# Patient Record
Sex: FEMALE | Race: WHITE | NOT HISPANIC OR LATINO | Employment: FULL TIME | URBAN - METROPOLITAN AREA
[De-identification: names, ages, dates, MRNs, and addresses within clinical notes are randomized per-mention and may not be internally consistent; named-entity substitution may affect disease eponyms.]

---

## 2020-06-29 ENCOUNTER — APPOINTMENT (OUTPATIENT)
Dept: RADIOLOGY | Facility: CLINIC | Age: 50
End: 2020-06-29
Payer: COMMERCIAL

## 2020-06-29 ENCOUNTER — OFFICE VISIT (OUTPATIENT)
Dept: URGENT CARE | Facility: CLINIC | Age: 50
End: 2020-06-29
Payer: COMMERCIAL

## 2020-06-29 VITALS
DIASTOLIC BLOOD PRESSURE: 85 MMHG | RESPIRATION RATE: 18 BRPM | SYSTOLIC BLOOD PRESSURE: 128 MMHG | HEIGHT: 63 IN | TEMPERATURE: 97.2 F | BODY MASS INDEX: 30.12 KG/M2 | OXYGEN SATURATION: 97 % | HEART RATE: 82 BPM | WEIGHT: 170 LBS
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
## 2020-06-29 DIAGNOSIS — S53.31XA GAMEKEEPER'S THUMB OF RIGHT HAND, INITIAL ENCOUNTER: ICD-10-CM
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
## 2020-06-29 DIAGNOSIS — S43.102A AC SEPARATION, LEFT, INITIAL ENCOUNTER: ICD-10-CM

## 2020-06-29 DIAGNOSIS — S22.32XA CLOSED FRACTURE OF ONE RIB OF LEFT SIDE, INITIAL ENCOUNTER: ICD-10-CM

## 2020-06-29 PROCEDURE — 71101 X-RAY EXAM UNILAT RIBS/CHEST: CPT

## 2020-06-29 PROCEDURE — 73140 X-RAY EXAM OF FINGER(S): CPT

## 2020-06-29 PROCEDURE — 73000 X-RAY EXAM OF COLLAR BONE: CPT

## 2020-06-29 PROCEDURE — 73030 X-RAY EXAM OF SHOULDER: CPT

## 2020-06-29 PROCEDURE — 99203 OFFICE O/P NEW LOW 30 MIN: CPT | Performed by: EMERGENCY MEDICINE

## 2020-07-01 ENCOUNTER — EVALUATION (OUTPATIENT)
Dept: PHYSICAL THERAPY | Facility: CLINIC | Age: 50
End: 2020-07-01
Payer: COMMERCIAL

## 2020-07-01 DIAGNOSIS — S43.102A AC SEPARATION, LEFT, INITIAL ENCOUNTER: Primary | ICD-10-CM

## 2020-07-01 DIAGNOSIS — M25.512 ACUTE PAIN OF LEFT SHOULDER: ICD-10-CM

## 2020-07-01 PROCEDURE — 97161 PT EVAL LOW COMPLEX 20 MIN: CPT | Performed by: PHYSICAL THERAPIST
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
## 2020-07-08 ENCOUNTER — OFFICE VISIT (OUTPATIENT)
Dept: PHYSICAL THERAPY | Facility: CLINIC | Age: 50
End: 2020-07-08
Payer: COMMERCIAL

## 2020-07-08 DIAGNOSIS — M25.512 ACUTE PAIN OF LEFT SHOULDER: ICD-10-CM

## 2020-07-08 DIAGNOSIS — S43.102A AC SEPARATION, LEFT, INITIAL ENCOUNTER: Primary | ICD-10-CM

## 2020-07-08 PROCEDURE — 97110 THERAPEUTIC EXERCISES: CPT

## 2020-07-08 NOTE — PROGRESS NOTES
Daily Note     Today's date: 2020  Patient name: Christin Araujo  : 1970  MRN: [id number]  Referring provider: Trista Mann MD  Dx:   Encounter Diagnosis     ICD-10-CM    1  AC separation, left, initial encounter S43 102A    2  Acute pain of left shoulder M25 512                   Subjective: Patient reports soreness and heaviness continues  Patient reports upper shoulder is really sore  Objective: See treatment diary below      Assessment: Christin Araujo continues with pain in shoulder and ribs  Pain in ribs appears to most limiting factor  Christin Araujo was able to perform exercises with proper technique and intensity  Lawerance Deny has increased in ability to actively move shoulder  she would benefit from continued PT to decrease upper trap hypertrophy  Plan: Continue per plan of care  Precautions: injury:  L rib fracture- no aggressive stretching or lifting      Daily Treatment Diary:      Initial Evaluation Date: 20  Compliance                    Visit Number 1 2                   Re-Eval  IE                 [address]   Foto Captured Y                                              Manual                      L shoulder ROM   SJ                   UT/LS STM on L  SJ                                         Ther-Ex                      pulleys   3'                   pendulums HEP                     Finger ladder    with scapular depression x10                   scap  restractions HEP                     Shoulder rolls-fwd/bwk HEP                     UT stretch  HEP                     Shoulder cane flex     x15                   C/S ROM HEP                     Retraction depressions   x15                                         Neuro Re-Ed                                                                                                Ther-Act                                                               Modalities                      h-wave/ CP prn 10' def

## 2020-07-13 ENCOUNTER — OFFICE VISIT (OUTPATIENT)
Dept: PHYSICAL THERAPY | Facility: CLINIC | Age: 50
End: 2020-07-13
Payer: COMMERCIAL

## 2020-07-13 DIAGNOSIS — M25.512 ACUTE PAIN OF LEFT SHOULDER: ICD-10-CM

## 2020-07-13 DIAGNOSIS — S43.102A AC SEPARATION, LEFT, INITIAL ENCOUNTER: Primary | ICD-10-CM

## 2020-07-13 PROCEDURE — 97110 THERAPEUTIC EXERCISES: CPT | Performed by: PHYSICAL THERAPIST

## 2020-07-13 NOTE — PROGRESS NOTES
Daily Note     Today's date: 2020  Patient name: Bennie Simental  : 1970  MRN: [id number]  Referring provider: Luz Maria Silva MD  Dx:   Encounter Diagnosis     ICD-10-CM    1  AC separation, left, initial encounter S43 102A    2  Acute pain of left shoulder M25 512                   Subjective: Pt notes that she has been feeling better and has been able to perform more ADL's at home  She states that the ribs are  and limit her ROM in her shoulder at times  Objective: See treatment diary below      Assessment: Tolerated treatment well with minimal pain noted in shoulder and ribs  Pt's rib pain continues to be a limiting factor, but is showing improvement since last visit  PT educated pt on proper posture and possible compensation patterns which could be contributing to the UT/LS tightness she is feeling  Pt had relief with manual TpR to area  Continue to incorporate shoulder strengthening exercises as tolerated  Introduced wall slides to perform at home for shoulder ROM, pt verbalized understanding  Patient demonstrated fatigue post treatment and would benefit from continued PT to address current limitations  Plan: Continue per plan of care  Precautions: injury:  L rib fracture- no aggressive stretching or lifting      Daily Treatment Diary:      Initial Evaluation Date: 20  Compliance                  Visit Number 1 2  3                 Re-Eval  IE   -              MC   Foto Captured Y   -                                         Manual                      L shoulder ROM    SJ -                 UT/LS STM on L  SJ arb                                       Ther-Ex                      pulleys    3'  4'                 pendulums HEP    2x10 circles, A/P                 Finger ladder     with scapular depression x10  with scapular depression 10x                  scap   restractions HEP                     Shoulder rolls-fwd/bwk HEP UT stretch  HEP    3x30"                 Shoulder cane flex      x15 2x10                 C/S ROM HEP    10x ea                 Retraction depressions   x15  2x10 5"                                       Neuro Re-Ed                                                                                                Ther-Act                                                               Modalities                      h-wave/ CP prn 10' def 10'

## 2020-07-20 ENCOUNTER — OFFICE VISIT (OUTPATIENT)
Dept: PHYSICAL THERAPY | Facility: CLINIC | Age: 50
End: 2020-07-20
Payer: COMMERCIAL

## 2020-07-20 DIAGNOSIS — M25.512 ACUTE PAIN OF LEFT SHOULDER: ICD-10-CM

## 2020-07-20 DIAGNOSIS — S43.102A AC SEPARATION, LEFT, INITIAL ENCOUNTER: Primary | ICD-10-CM

## 2020-07-20 PROCEDURE — 97110 THERAPEUTIC EXERCISES: CPT | Performed by: PHYSICAL THERAPIST

## 2020-07-20 NOTE — PROGRESS NOTES
Daily Note     Today's date: 2020  Patient name: Kevin Pedroza  : 1970  MRN: [id number]  Referring provider: Shawanda Gonzalez MD  Dx:   Encounter Diagnosis     ICD-10-CM    1  AC separation, left, initial encounter S43 102A    2  Acute pain of left shoulder M25 512                   Subjective: Patient feels good today, was able to sleep in bed last night and is tolerating supine positioning better  Reports some tightness after a lot of activity yesterday  Objective: See treatment diary below    Patient was treated by SUGEY Butterfield under the direct supervision of Juliana Donohue, PT, DPT for 30 mins of Therex and 10 mins of manual     Assessment: Tolerated treatment well  Patient reports that her rib pain is decreasing which allows better ROM with shoulder elevation  Serratus punches and D2 PNF were added to strengthen scapular stabilizers, patient is tolerating overhead exercises better than last session  She reports completing HEP 4x per week and increasing activity with both arms, including vacuuming, gardening and driving, indicating improved tolerance to activity and proper healing  Ribs are  to palpation and elevation with internal rotation causes discomfort in [facility] joint with resistance  Patient exhibited good technique with therapeutic exercises and would benefit from continued PT to address limitations  Plan: Continue per plan of care  Progress treatment as tolerated         Precautions: injury:  L rib fracture- no aggressive stretching or lifting      Daily Treatment Diary:      Initial Evaluation Date: 20  Compliance                Visit Number 1 2   3  4               Re-Eval  IE    -  -            MC   Foto Captured Y    -  -                                     Manual                      L shoulder ROM    SJ -  dvl               UT/LS STM on L  SJ arb  dvl               UT/LS TPR       dvl               Ther-Ex pulleys    3'   4'  5'               pendulums HEP     2x10 circles, A/P  30x circles ea               Finger ladder     with scapular depression x10   with scapular depression 10x   15x               scap  restractions HEP      -               Shoulder rolls-fwd/bwk HEP      -               UT stretch  HEP     3x30"  3x30"               Shoulder cane flex      x15 2x10  sitting 2x10               C/S ROM HEP     10x ea  15x ea               Retraction depressions   x15   2x10 5"  2x15 5" into plinth               Serratus punches    2x10          PNF D2      AAROM + manual resist               Neuro Re-Ed                                                                                                Ther-Act                                                               Modalities                      h-wave/ CP prn 10' def 10'  10'

## 2020-07-27 ENCOUNTER — OFFICE VISIT (OUTPATIENT)
Dept: PHYSICAL THERAPY | Facility: CLINIC | Age: 50
End: 2020-07-27
Payer: COMMERCIAL

## 2020-07-27 DIAGNOSIS — M25.512 ACUTE PAIN OF LEFT SHOULDER: ICD-10-CM

## 2020-07-27 DIAGNOSIS — S43.102A AC SEPARATION, LEFT, INITIAL ENCOUNTER: Primary | ICD-10-CM

## 2020-07-27 PROCEDURE — 97110 THERAPEUTIC EXERCISES: CPT | Performed by: PHYSICAL THERAPIST

## 2020-07-27 NOTE — PROGRESS NOTES
Daily Note     Today's date: 2020  Patient name: Sandra Peña  : 1970  MRN: [id number]  Referring provider: Rainer Edouard MD  Dx:   Encounter Diagnosis     ICD-10-CM    1  AC separation, left, initial encounter S43 102A    2  Acute pain of left shoulder M25 512        Start Time: 0830          Subjective: Patient reports that her pain has been decreasing steadily over the past week  She continues to feel tightness in the UT, notes that stretching helps  She reports that she felt discomfort in her shoulder when pushing one of her horses  Objective: See treatment diary below    Patient was treated by SUGEY Vargas under the direct supervision of Kirstin Enciso, PT        Assessment: Tolerated treatment progressions well, patient was able to complete exercises without an increase in pain  Patient reports that her L shoulder feels weak with protraction and pressing, future sessions will continue to progress these movements  Apparent ROM continues to increase with some guarding noted in end range elevation  Patient exhibited good technique with therapeutic exercises and would benefit from continued PT to address limitations  Plan: Continue per plan of care  Progress treatment as tolerated         Precautions: injury:  L rib fracture- no aggressive stretching or lifting      Daily Treatment Diary:      Initial Evaluation Date: 20  Compliance              Visit Number 1 2   3   4  5             Re-Eval  IE    -   -  -          MC   Foto Captured Y    -   -  Y                                 Manual                      L shoulder ROM    SJ -   dvl  dvl             UT/LS STM on L  SJ arb   dvl  dvl             UT/LS TPR       dvl  dvl             Ther-Ex                      pulleys    3'   4'   5'  -             Arm bike  - - - 5'        pendulums HEP     2x10 circles, A/P   30x circles ea  30x             Finger ladder     with scapular depression x10   with scapular depression 10x    15x  10x 10" at top             scap  retractions HEP       -  -             Shoulder rolls-fwd/bwk HEP       -  -             UT stretch  HEP     3x30"   3x30"  4x30"             Wall pushups     2x10        Shoulder cane flex      x15 2x10   sitting 2x10  sitting 3x10             C/S ROM HEP     10x ea   15x ea  20x ea             Retraction depressions   x15   2x10 5"   2x15 5" into plinth  30x 5" into plinth             Serratus punches    2x10  #3 2x10        PNF D2      AAROM + manual resist 5'  -             Neuro Re-Ed                                                                                                Ther-Act                                                               Modalities                      h-wave/ CP prn 10' def 10'  10' 10'

## 2020-08-03 ENCOUNTER — APPOINTMENT (OUTPATIENT)
Dept: PHYSICAL THERAPY | Facility: CLINIC | Age: 50
End: 2020-08-03
Payer: COMMERCIAL

## 2020-08-10 ENCOUNTER — APPOINTMENT (OUTPATIENT)
Dept: PHYSICAL THERAPY | Facility: CLINIC | Age: 50
End: 2020-08-10
Payer: COMMERCIAL

## 2020-08-12 ENCOUNTER — OFFICE VISIT (OUTPATIENT)
Dept: PHYSICAL THERAPY | Facility: CLINIC | Age: 50
End: 2020-08-12
Payer: COMMERCIAL

## 2020-08-12 DIAGNOSIS — M25.512 ACUTE PAIN OF LEFT SHOULDER: ICD-10-CM

## 2020-08-12 DIAGNOSIS — S43.102A AC SEPARATION, LEFT, INITIAL ENCOUNTER: Primary | ICD-10-CM

## 2020-08-12 PROCEDURE — 97140 MANUAL THERAPY 1/> REGIONS: CPT

## 2020-08-12 NOTE — PROGRESS NOTES
Daily Note     Today's date: 2020  Patient name: Jayla Holm  : 1970  MRN: [id number]  Referring provider: Zack Anton MD  Dx:   Encounter Diagnosis     ICD-10-CM    1  AC separation, left, initial encounter  S43 102A    2  Acute pain of left shoulder  M25 512                   Subjective: Pt mentions that she is seeing improvement throughout her therapy  She states that she has been able to start doing her housework without pain but remains cautious of working with her horses and has not yet started riding  She states that her pain has decreased significantly but still feels some discomfort in her ribs  She also mentions that she feels slightly weak in her L shoulder and would like to work on strengthening that area  Objective: See treatment diary below      Assessment: Continued shoulder depressions and retractions to assist with increasing strength to B shoulders  Pt was able to complete serratus punches with #3 free weight with minimal discomfort to affected area, mainly felt muscle fatigue post exercise  Continued stretching to UT, pt responded well and displayed increased ROM post exercise  Continued STM and TrP release to UT, pt felt less tense post intervention and displayed increased ROM at end of Rx  Plan: Continue per plan of care  Continue to progress as necessary  Patient was treated by Lew Aschoff, SPTA under supervision of Iesha Orlando PTA-IS       Precautions: injury:  L rib fracture- no aggressive stretching or lifting      Daily Treatment Diary:      Initial Evaluation Date: 20  Compliance            Visit Number 1 2   3   4  5  6           Re-Eval  IE    -   -  -          MC   Foto Captured Y    -   -  Y                                Manual                      L shoulder ROM    SJ -   dvl  dvl  SES           UT/LS STM on L  SJ arb   dvl  dvl  SES           UT/LS TPR       dvl  dvl SES           Ther-Ex                      pulleys    3'   4'   5'  -             Arm bike  - - - 5' 5'       pendulums HEP     2x10 circles, A/P   30x circles ea  30x  30x           Finger ladder     with scapular depression x10   with scapular depression 10x    15x  10x 10" at top  10x10'' at top           scap  retractions HEP       -  -             Shoulder rolls-fwd/bwk HEP       -  -             UT stretch  HEP     3x30"   3x30"  4x30"  4x30''           Wall pushups     2x10 2x10       Shoulder cane flex      x15 2x10   sitting 2x10  sitting 3x10  sitting 3x10           C/S ROM HEP     10x ea   15x ea  20x ea  20x ea           Retraction depressions   x15   2x10 5"   2x15 5" into plinth  30x 5" into plinth  30x 5'' into plinth           Serratus punches    2x10  #3 2x10 #3 2x10       PNF D2      AAROM + manual resist 5'  -             Neuro Re-Ed                                                                                                Ther-Act                                                               Modalities                      h-wave/ CP prn 10' def 10'  10' 10' NP

## 2020-08-17 ENCOUNTER — OFFICE VISIT (OUTPATIENT)
Dept: PHYSICAL THERAPY | Facility: CLINIC | Age: 50
End: 2020-08-17
Payer: COMMERCIAL

## 2020-08-17 DIAGNOSIS — M25.512 ACUTE PAIN OF LEFT SHOULDER: ICD-10-CM

## 2020-08-17 DIAGNOSIS — S43.102A AC SEPARATION, LEFT, INITIAL ENCOUNTER: Primary | ICD-10-CM

## 2020-08-17 PROCEDURE — 97140 MANUAL THERAPY 1/> REGIONS: CPT

## 2020-08-17 NOTE — PROGRESS NOTES
Daily Note     Today's date: 2020  Patient name: Bard Banks  : 1970  MRN: [id number]  Referring provider: Santa Atr MD  Dx:   Encounter Diagnosis     ICD-10-CM    1  AC separation, left, initial encounter  S43 102A    2  Acute pain of left shoulder  M25 512                   Subjective: Patient reports has some rib discomfort from mowing with the tractor but her shoulder feels pretty good  Objective: See treatment diary below      Assessment: Bard Banks continues with good  progress towards goals  Patient pain appears to be in ribs should decrease with limiting movements that increase pain  Jesse's shoulder appears to be increasing ROM and improving in regards to strengthening  she required moderate verbal cueing to complete exercises appropriate form and intensity  Patient shoulder function should improve with continued treatments  Plan: Continue per plan of care  Precautions: injury:  L rib fracture- no aggressive stretching or lifting      Daily Treatment Diary:      Initial Evaluation Date: 20  Compliance          Visit Number 1 2   3   4  5  6  7         Re-Eval  IE    -   -  -          MC   Foto Captured Y    -   -  Y                             Manual                      L shoulder ROM    SJ -   dvl  dvl  SES  SJ         UT/LS STM on L  SJ arb   dvl  dvl  SES  SJ         UT/LS TPR       dvl  dvl  SES  SJ         Ther-Ex                      pulleys    3'   4'   5'  -             Arm bike  - - - 5' 5' 5'/5'      pendulums HEP     2x10 circles, A/P   30x circles ea  30x  30x  1#  x30         Finger ladder     with scapular depression x10   with scapular depression 10x    15x  10x 10" at top  10x10'' at top  10x 10" at top         scap   retractions HEP       -  -             Shoulder rolls-fwd/bwk HEP       -  -             UT stretch  HEP     3x30"   3x30"  4x30"  4x30'' 4x30"         Wall pushups     2x10 2x10 2x10      Shoulder cane flex      x15 2x10   sitting 2x10  sitting 3x10  sitting 3x10  sitting 3x10         C/S ROM HEP     10x ea   15x ea  20x ea  20x ea  20x ea         Retraction depressions   x15   2x10 5"   2x15 5" into plinth  30x 5" into plinth  30x 5'' into plinth  30x 5'' into plinth         Serratus punches    2x10  #3 2x10 #3 2x10 #3  3x10      PNF D2      AAROM + manual resist 5'  -             Neuro Re-Ed                                                                                                Ther-Act                                                               Modalities                      h-wave/ CP prn 10' def 10'  10' 10' NP def

## 2020-08-24 ENCOUNTER — OFFICE VISIT (OUTPATIENT)
Dept: PHYSICAL THERAPY | Facility: CLINIC | Age: 50
End: 2020-08-24
Payer: COMMERCIAL

## 2020-08-24 DIAGNOSIS — M25.512 ACUTE PAIN OF LEFT SHOULDER: ICD-10-CM

## 2020-08-24 DIAGNOSIS — S43.102A AC SEPARATION, LEFT, INITIAL ENCOUNTER: Primary | ICD-10-CM

## 2020-08-24 PROCEDURE — 97110 THERAPEUTIC EXERCISES: CPT | Performed by: PHYSICAL THERAPIST

## 2020-08-24 NOTE — PROGRESS NOTES
Daily Note     Today's date: 2020  Patient name: Angel Christianson  : 1970  MRN: [id number]  Referring provider: Pam Sarmiento MD  Dx:   Encounter Diagnosis     ICD-10-CM    1  AC separation, left, initial encounter  S43 102A    2  Acute pain of left shoulder  M25 512                   Subjective: Pt states that she continues to have some pain in ribs, but her shoulder is moving better  She has been compliant with HEP  Objective: See treatment diary below      Assessment: Tolerated treatment well  Shoulder ROM and strength continues to improve  Introduced shoulder stabilization exercises today, she noted fatigue, but no increased pain  Pt continues to complain of minimal rib discomfort with specific movements, but is improving  UT/ LS continues to be tight L>R, she notes that the stretches at home help with her tightness  Patient demonstrated fatigue post treatment and would benefit from continued PT to address current limitations  Pt was seen by SUGEY Jolley under direct supervision of Rad Carrera, PT, DPT, CLT for 10' of manual      Plan: Continue per plan of care        Precautions: injury:  L rib fracture- no aggressive stretching or lifting      Daily Treatment Diary:      Initial Evaluation Date: 20  Compliance        Visit Number 1 2   3   4  5  6  7  8       Re-Eval  IE    -   -  -      -       Foto Captured Y    -   -  Y      -                     Manual                      L shoulder ROM    SJ -   dvl  dvl  SES  SJ  -       UT/LS STM on L  SJ arb   dvl  dvl  SES  SJ  AK       UT/LS TPR       dvl  dvl  SES  SJ  AK       Ther-Ex                      pulleys    3'   4'   5'  -             Arm bike  - - - 5' 5' 5'/5' 5'/5'     pendulums HEP     2x10 circles, A/P   30x circles ea  30x  30x  1#  x30 #1 30x        Finger ladder     with scapular depression x10   with scapular depression 10x    15x  10x 10" at top  10x10'' at top  10x 10" at top 10x10   at top        scap  retractions HEP       -  -             Shoulder rolls-fwd/bwk HEP       -  -             UT stretch  HEP     3x30"   3x30"  4x30"  4x30''  4x30"  4x30"       Wall pushups     2x10 2x10 2x10 2x10      Shoulder cane flex      x15 2x10   sitting 2x10  sitting 3x10  sitting 3x10  sitting 3x10  scaption- no cane 2x10 #1, 10x #2       C/S ROM HEP     10x ea   15x ea  20x ea  20x ea  20x ea  20x ea       Retraction depressions   x15   2x10 5"   2x15 5" into plinth  30x 5" into plinth  30x 5'' into plinth  30x 5'' into plinth  TB rows red 2x10        Serratus punches    2x10  #3 2x10 #3 2x10 #3  3x10 #4 3x10      PNF D2      AAROM + manual resist 5'  -      -       Neuro Re-Ed                                                                                                Ther-Act                                                               Modalities                      h-wave/ CP prn 10' def 10'  10' 10' NP def

## 2020-08-31 ENCOUNTER — EVALUATION (OUTPATIENT)
Dept: PHYSICAL THERAPY | Facility: CLINIC | Age: 50
End: 2020-08-31
Payer: COMMERCIAL

## 2020-08-31 DIAGNOSIS — M25.512 ACUTE PAIN OF LEFT SHOULDER: ICD-10-CM

## 2020-08-31 DIAGNOSIS — S43.102A AC SEPARATION, LEFT, INITIAL ENCOUNTER: Primary | ICD-10-CM

## 2020-08-31 PROCEDURE — 97110 THERAPEUTIC EXERCISES: CPT | Performed by: PHYSICAL THERAPIST

## 2020-08-31 NOTE — PROGRESS NOTES
Re-Evalaution    Today's date: 2020  Patient name: Reymundo Jennings  : 1970  MRN: [id number]  Referring provider: Yue Jarrett MD  Dx:   Encounter Diagnosis     ICD-10-CM    1  AC separation, left, initial encounter  S43 102A    2  Acute pain of left shoulder  M25 512                      Assessment  Reymundo Jennings has been compliant with attending PT and HEP since initial eval  Her pain levels, ROM, and strength in L shoulder have improved since IE  Reymundo Jennings has made improvements in objective data since IE but is still limited compared to normal  She continues to have weakness in L shoulder compared to contralateral side and some tightness at end range of L shoulder motion  Reymundo Jennings continues with above listed impairments and would benefit from additional skilled PT to address these deficits to return to PLOF  Impairments: abnormal muscle firing, abnormal or restricted ROM, activity intolerance, impaired physical strength, lacks appropriate home exercise program, pain with function and poor posture     Symptom irritability: highUnderstanding of Dx/Px/POC: excellent    Goals  STG  1) L shoulder ROM will improve 5-10 degrees in 3 weeks  MET  2) L shoulder strength will improve 1/2 grade in 3 weeks  MET  3) Pt will decrease pain levels 2-3 at its worst in 3 weeks  MET    LTG  1) Patient is independent in HEP within 6 weeks  MET  2) Patient completes overhead activity with 0-1/10 pain within 6 weeks  PARTIALLY MET (some discomfort with repetitive activity OH)  3) L shoulder ROM will be WNL by D/C  PARTIALLY MET (some tightness in L shoulder abduction)  4) L shoulder strength will be equal to contralateral side by D/C   NOT MET    Plan  Patient would benefit from: skilled physical therapy  Planned modality interventions: hydrotherapy, H-Wave, high voltage pulsed current: pain management, cryotherapy and TENS  Planned therapy interventions: neuromuscular re-education, massage, manual therapy, joint mobilization, abdominal trunk stabilization, body mechanics training, breathing training, patient education, strengthening, stretching, therapeutic activities, flexibility, functional ROM exercises, therapeutic exercise and home exercise program  Frequency: 1x week (Pt has a high deductible- is going to be self-pay for right now)  Duration in weeks: 3  Treatment plan discussed with: patient        Subjective Evaluation    History of Present Illness  Pt notes that her ROM and pain levels have improved immensely since she has been coming to PT  She notes that holding weight in her L arm is the what causes her pain currently  She notes that she is able to push and pull without a problem now  She notes that the rib pain has been much better, but lifting from the floor is bothersome at times  She states that repatative motion OH will cause some discomfort in anterior shoulder, but is improving  Quality of life: excellent    Pain  Current pain ratin  At best pain ratin  At worst pain ratin  Location: UT, AC joint on L  Quality: dull ache and sharp  Relieving factors: heat and ice  Aggravating factors: lifting and overhead activity    Hand dominance: right      Diagnostic Tests  X-ray: abnormal (rib fracture on L)  Treatments  No previous or current treatments  Patient Goals  Patient goals for therapy: decreased pain, increased motion, increased strength, independence with ADLs/IADLs and return to work  Patient goal: "I want to get back to mountain biking "        Objective     Postural Observations  Seated posture: fair  Standing posture: fair    Additional Postural Observation Details  Rounded shoulder posture     Palpation   Left   Hypertonic in the upper trapezius  Minimal tenderness of the levator scapulae, serratus anterior, supraspinatus and upper trapezius  Trigger point to upper trapezius       Tenderness     Left Shoulder   Minimal tenderness in the [facility] joint, acromion, clavicle and medial scapula  Cervical/Thoracic Screen   Cervical range of motion within normal limits  Cervical range of motion within normal limits with the following exceptions: Some soreness felt during flex/ext  On R    Neurological Testing     Sensation     Shoulder   Left Shoulder   Intact: light touch    Right Shoulder   Intact: light touch    Active Range of Motion   Left Shoulder   Flexion: WNL  Abduction: WNL- tightness felt at end range  External rotation 45°: WNL  Internal rotation 45°: WNL    Right Shoulder   Normal active range of motion          Passive Range of Motion   Left Shoulder   Flexion: WNL  Abduction: WNL  External rotation 45°: WNL  Internal rotation 45°: WNL    Strength/Myotome Testing     Right Shoulder   Normal muscle strength    Additional Strength Details  Flexion: 4/5  Abduction:4/5  Extension: 4+/5  ER:4+/5  IR:4+/5    Tests     Left Shoulder   Negative AC shear, Hawkin's, passive horizontal adduction and sulcus sign  Additional Tests Details  No step down present at [facility] joint on L       Pt was seen by Phuong Lopez PTA for manual work today            Precautions: injury: 6/26 L rib fracture- no aggressive stretching or lifting      Daily Treatment Diary:      Initial Evaluation Date: 07/01/20  Compliance 7/1 7/8 7/13 7/20 7/27 8/12 8/17 8/24 8/31     Visit Number 1 2   3   4  5  6  7  8  9     Re-Eval  IE    -   -  -      -  -     Foto Captured Y    -   -  Y      -  -            7/1 7/8 7/13 7/20 7/27 8/12 8/17 8/24 8/31     Manual                      L shoulder ROM    SJ -   dvl  dvl  SES  SJ  -       UT/LS STM on L  SJ arb   dvl  dvl  SES  SJ  AK  SJ     UT/LS TPR       dvl  dvl  SES  SJ  AK  SJ     Ther-Ex                      pulleys    3'   4'   5'  -             Arm bike  - - - 5' 5' 5'/5' 5'/5' 3/3' L2    pendulums HEP     2x10 circles, A/P   30x circles ea  30x  30x  1#  x30 #1 30x   D/C     Finger ladder     with scapular depression x10 with scapular depression 10x    15x  10x 10" at top  10x10'' at top  10x 10" at top 10x10   at top   D/C     UT stretch  HEP     3x30"   3x30"  4x30"  4x30''  4x30"  4x30"       Wall pushups     2x10 2x10 2x10 2x10  Plinth 2x10     Shoulder cane flex  x15 2x10   sitting 2x10  sitting 3x10  sitting 3x10  sitting 3x10  scaption- no cane 2x10 #1, 10x #2  D/C     C/S ROM HEP     10x ea   15x ea  20x ea  20x ea  20x ea  20x ea       Retraction depressions   x15   2x10 5"   2x15 5" into plinth  30x 5" into plinth  30x 5'' into plinth  30x 5'' into plinth  TB rows red 2x10        Serratus punches    2x10  #3 2x10 #3 2x10 #3  3x10 #4 3x10      Serratus wall climbs         Green 2x10 5"    Ball slaps         To fatigue- yellow     Y's /T's         2x10 ea     Body blade         To fatigue 90 deg  flex And 90 abd   And ER 3x30"                 Neuro Re-Ed                                                                                                Ther-Act                                                               Modalities                      h-wave/ CP prn 10' def 10'  10' 10' NP def

## 2020-09-14 ENCOUNTER — OFFICE VISIT (OUTPATIENT)
Dept: PHYSICAL THERAPY | Facility: CLINIC | Age: 50
End: 2020-09-14
Payer: COMMERCIAL

## 2020-09-14 DIAGNOSIS — S43.102A AC SEPARATION, LEFT, INITIAL ENCOUNTER: Primary | ICD-10-CM

## 2020-09-14 DIAGNOSIS — M25.512 ACUTE PAIN OF LEFT SHOULDER: ICD-10-CM

## 2020-09-14 PROCEDURE — 97140 MANUAL THERAPY 1/> REGIONS: CPT

## 2020-09-14 NOTE — PROGRESS NOTES
Daily Note     Today's date: 2020  Patient name: Brigid Finney  : 1970  MRN: [id number]  Referring provider: Vanessa Marquez MD  Dx:   Encounter Diagnosis     ICD-10-CM    1  AC separation, left, initial encounter  S43 102A    2  Acute pain of left shoulder  M25 512                   Subjective: Patient reports some tension in between her shoulder blades but anterior shoulder pain is gone  Reports able to use shoulder a lot more for things like mowing and horseback riding  Objective: See treatment diary below      Assessment: Brigid sandyues with good  progress towards functional use goals  Patient strength appears to be improving but requires focus of treatement to improve scapular stability which appears to to be cause of medial scapular "tension"  she required moderate verbal cueing to complete exercises appropriate form and intensity with minimal tactile cues  Patient scapular stability should improve with continued treatments  Plan: Continue per plan of care        Precautions: injury:  L rib fracture- no aggressive stretching or lifting      Daily Treatment Diary:      Initial Evaluation Date: 20  Compliance    Visit Number 1 2   3   4  5  6  7  8  9 10   Re-Eval  IE    -   -  -      -  -     Foto Captured Y    -   -  Y      -  -               Manual                      L shoulder ROM    SJ -   dvl  dvl  SES  SJ  -       UT/LS STM on L  SJ arb   dvl  dvl  SES  SJ  AK  SJ  sj   UT/LS TPR       dvl  dvl  SES  SJ  AK  SJ  sj   Ther-Ex                      pulleys    3'   4'   5'  -             Arm bike  - - - 5' 5' 5'/5' 5'/5' 3/3' L2 4'/4' L2   pendulums HEP     2x10 circles, A/P   30x circles ea  30x  30x  1#  x30 #1 30x   D/C     Finger ladder     with scapular depression x10   with scapular depression 10x    15x  10x 10" at top  10x10'' at top  10x 10" at top 10x10   at top   D/C     UT stretch  HEP     3x30"   3x30"  4x30"  4x30''  4x30"  4x30"    4x30"   Wall pushups     2x10 2x10 2x10 2x10  Plinth 2x10  Plinth 2x10    Shoulder cane flex  x15 2x10   sitting 2x10  sitting 3x10  sitting 3x10  sitting 3x10  scaption- no cane 2x10 #1, 10x #2     scaption-3x10  #2   C/S ROM HEP     10x ea   15x ea  20x ea  20x ea  20x ea  20x ea   x20 ea   Retraction depressions   x15   2x10 5"   2x15 5" into plinth  30x 5" into plinth  30x 5'' into plinth  30x 5'' into plinth  TB rows red 2x10        Serratus punches    2x10  #3 2x10 #3 2x10 #3  3x10 #4 3x10   5#  3x10   Serratus wall climbs         Green 2x10 5" Green 2x10 5"   Ball slaps         To fatigue- yellow  To fatigue- yellow   Y's /T's         2x10 ea  2x10 ea   Body blade         To fatigue 90 deg  flex And 90 abd  And ER 3x30" 90 deg  flex And 90 abd   And ER 3x30"                Neuro Re-Ed                                                                                                Ther-Act              theraband rowing                   Green 2x10   theraband extension          green 2x10   theraband IR/ER          Green   2x10 ea    Modalities                      h-wave/ CP prn 10' def 10'  10' 10' NP def

## 2020-09-21 ENCOUNTER — APPOINTMENT (OUTPATIENT)
Dept: PHYSICAL THERAPY | Facility: CLINIC | Age: 50
End: 2020-09-21
Payer: COMMERCIAL

## 2020-09-23 ENCOUNTER — OFFICE VISIT (OUTPATIENT)
Dept: PHYSICAL THERAPY | Facility: CLINIC | Age: 50
End: 2020-09-23
Payer: COMMERCIAL

## 2020-09-23 DIAGNOSIS — S43.102A AC SEPARATION, LEFT, INITIAL ENCOUNTER: Primary | ICD-10-CM

## 2020-09-23 DIAGNOSIS — M25.512 ACUTE PAIN OF LEFT SHOULDER: ICD-10-CM

## 2020-09-23 PROCEDURE — 97140 MANUAL THERAPY 1/> REGIONS: CPT

## 2020-09-23 NOTE — PROGRESS NOTES
Daily Note     Today's date: 2020  Patient name: Radha Mcgovern  : 1970  MRN: [id number]  Referring provider: Luna Acharya MD  Dx:   Encounter Diagnosis     ICD-10-CM    1  AC separation, left, initial encounter  S43 102A    2  Acute pain of left shoulder  M25 512                   Subjective: Radha Mcgovern reports that she is feeling good today, and is starting to be able to lift more at home  Objective: See treatment diary below      Assessment: Radha Mcgovern tolerated treatment well needed very minimal verbal cues throughtout treatment to do it correctly  She continues with strengthening exercises with a stready progression towards goals, her stability appears to be getting better  With continued therapy abdi should keep making a steady progression towards her goals and increasing her scapular stability       Plan: Continue per plan of care  Patient was treated by CAROL Ordonez under direct supervision of Sunitha Campbell PTA-IS  Precautions: injury:  L rib fracture- no aggressive stretching or lifting      Daily Treatment Diary:      Initial Evaluation Date: 20  Compliance    Visit Number 11         10   Re-Eval               Foto Captured                        Manual              L shoulder ROM              UT/LS STM on L TN           sj   UT/LS TPR TN          sj   Ther-Ex              pulleys              Arm bike 4'/4'         4'/4' L2   pendulums              Finger ladder               UT stretch  4x30"          4x30"   Wall pushups Plinth   2x10         Plinth 2x10    Shoulder cane flex  scaption   3x10   #2           scaption-3x10  #2   C/S ROM 20x ea         x20 ea   Retraction depressions              Serratus punches 5#   3x10         5#  3x10   Serratus wall climbs Green   2x10 5"         Green 2x10 5"   Ball slaps To fatigue   yellow         To fatigue- yellow   Y's /T's 2x10 ea         2x10 ea   Body blade 90 deg  flex And 90 abd  And ER 3x30"         90 deg  flex And 90 abd   And ER 3x30"                Neuro Re-Ed                                                                                Ther-Act              theraband rowing Green   2x10          Green 2x10   theraband extension Green   2x10         green 2x10   theraband IR/ER Green  2x10           Green   2x10 ea    Modalities              h-wave/ CP prn

## 2020-09-28 ENCOUNTER — APPOINTMENT (OUTPATIENT)
Dept: PHYSICAL THERAPY | Facility: CLINIC | Age: 50
End: 2020-09-28
Payer: COMMERCIAL

## 2020-10-05 ENCOUNTER — OFFICE VISIT (OUTPATIENT)
Dept: PHYSICAL THERAPY | Facility: CLINIC | Age: 50
End: 2020-10-05
Payer: COMMERCIAL

## 2020-10-05 DIAGNOSIS — M25.512 ACUTE PAIN OF LEFT SHOULDER: ICD-10-CM

## 2020-10-05 DIAGNOSIS — S43.102A AC SEPARATION, LEFT, INITIAL ENCOUNTER: Primary | ICD-10-CM

## 2020-10-05 PROCEDURE — 97110 THERAPEUTIC EXERCISES: CPT | Performed by: PHYSICAL THERAPIST

## 2020-10-12 ENCOUNTER — APPOINTMENT (OUTPATIENT)
Dept: PHYSICAL THERAPY | Facility: CLINIC | Age: 50
End: 2020-10-12
Payer: COMMERCIAL

## 2020-10-19 ENCOUNTER — APPOINTMENT (OUTPATIENT)
Dept: PHYSICAL THERAPY | Facility: CLINIC | Age: 50
End: 2020-10-19
Payer: COMMERCIAL

## 2020-10-26 ENCOUNTER — APPOINTMENT (OUTPATIENT)
Dept: PHYSICAL THERAPY | Facility: CLINIC | Age: 50
End: 2020-10-26
Payer: COMMERCIAL